# Patient Record
(demographics unavailable — no encounter records)

---

## 2024-11-12 NOTE — HISTORY OF PRESENT ILLNESS
[de-identified] : 75yopresetns for a follow up. 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 Path- Papillary oncocytic cystadenoma, ruptured and inflamed- 3 benign lymph nodes.-benign process as expected